# Patient Record
Sex: FEMALE | Race: BLACK OR AFRICAN AMERICAN | Employment: FULL TIME | URBAN - METROPOLITAN AREA
[De-identification: names, ages, dates, MRNs, and addresses within clinical notes are randomized per-mention and may not be internally consistent; named-entity substitution may affect disease eponyms.]

---

## 2022-03-19 PROBLEM — R00.2 PALPITATIONS: Status: ACTIVE | Noted: 2022-02-17

## 2022-06-21 PROBLEM — R06.09 DOE (DYSPNEA ON EXERTION): Status: ACTIVE | Noted: 2022-06-21

## 2023-10-23 ENCOUNTER — OFFICE VISIT (OUTPATIENT)
Dept: ORTHOPEDIC SURGERY | Age: 28
End: 2023-10-23

## 2023-10-23 DIAGNOSIS — M54.2 CHRONIC NECK PAIN: Primary | ICD-10-CM

## 2023-10-23 DIAGNOSIS — G89.29 CHRONIC NECK PAIN: Primary | ICD-10-CM

## 2023-10-23 RX ORDER — TIZANIDINE 2 MG/1
2 TABLET ORAL 2 TIMES DAILY
Qty: 30 TABLET | Refills: 2 | Status: SHIPPED | OUTPATIENT
Start: 2023-10-23

## 2023-10-23 NOTE — PROGRESS NOTES
Name: Rosy Cornelius  YOB: 1995  Gender: female  MRN: [id number]    CC:   Chief Complaint   Patient presents with    Neck Pain    New Patient         HPI:   Rosy Cornelius is a 33 y.o. 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 They present here for evaluation of neck pain. This been going on for approximately 3 years or so. She reports she was stretching in class in college years back and felt a pop in her neck. She has had bothersome neck pain since then. Not having any radicular symptoms or features. It is gradually gotten worse particularly when she is trying to sleep. She works nights as a nurse. Her neck pain wakes her up in the evening now at this point. She uses different pillows to accommodate and this has been her method of managing for the last few years. When she was having her AVM prior to being surgically coiled, she was having some numbness in her left arm with some vision changes and speech disruption. These have all essentially resolved since her AVM surgery. She has been advised to avoid NSAIDs given her vascular history.           Past Medical History Includes:   Past Medical History:   Diagnosis Date    Acne     Anxiety     Ill-defined condition     migraines    Knee pain     Migraines     Syncope    ,   Past Surgical History:   Procedure Laterality Date    IR TRANSCATH EMBOLIZATION NON CNS  4/8/2022    IR TRANSCATH EMBOLIZATION NON CNS  4/8/2022    IR TRANSCATH EMBOLIZATION NON CNS 4/8/2022 [facility]     Family History:   Family History   Problem Relation Age of Onset    Fainting Brother 25        vaso vagal syncope      Social History:   Social History     Tobacco Use    Smoking status: Never    Smokeless tobacco: Never   Substance Use Topics    Alcohol use: No       ALLERGIES:   Allergies   Allergen Reactions    Amoxicillin Other (See Comments)          Patient Medications    Current

## 2023-11-29 ENCOUNTER — TELEPHONE (OUTPATIENT)
Age: 28
End: 2023-11-29

## 2023-11-30 ENCOUNTER — TELEPHONE (OUTPATIENT)
Age: 28
End: 2023-11-30

## 2023-11-30 NOTE — PROGRESS NOTES
Right Left Comment   Flexion -4 -4    Scaption 4 4    IR  -4 -4    ER -4 -4    Extension      Elbow flexion      Elbow extension                           Treatment provided today consisted of initial evaluation followed by: Therapeutic exercise (29278) x 20 min to address ROM/strength deficits and to develop an initial HEP as noted below. Included:   AROM cervical spine (flexion, SB and Rot) 10 seconds hold 5x each  Levator stretch 30 seconds hold  Scap squeeze  Shoulder rolls back    Unattended electrical stimulation (23926/) x 15 minutes to bilateral UTs and cervical paraspinals in order to reduce pain and muscle spasms associated with increased activities in therapy as well as help reduce delayed pain episodes. Patient Education on the condition/pathology, involved anatomy, and exercise rationale. Education on posture with sitting and driving. Education for postioning for sleep and performing tasks. Education about posture for patient care and modifying how she does work duties. CLINICAL DECISION MAKING/ASSESSMENT     Personal Factors/co-morbidities affecting POC (1-2 Medium/3+High): coping styles/strategies  habits/routines  past/current experiences  profession  co-morbidities as previously noted   Problem List: (1-2 Low/ 3 Medium/ 4+ High) Pain  ROM limitations  Strength deficits  Impaired posture  Decreased endurance/activity Tolerance  ADL/functional limitations/modifications  Limited work/occupational capacity  Restricted recreational participation  Decreased Rooks with Home Exercise Program  Difficulty sleeping    Clinical decision making: moderate complexity with questionable prediction of expectations and future outcomes which may require adjustments to the POC. Prognosis: good   Benefits and precautions of treatment explained to patient.   Rosy Cornelius is a 33 y.o. female who presents to therapy today with evolving/changing clinical presentation (moderate complexity)

## 2023-11-30 NOTE — TELEPHONE ENCOUNTER
Pt called back in and wanted to take an earlier appt for Friday---I also scheduled her a few weeks out.

## 2023-12-01 ENCOUNTER — EVALUATION (OUTPATIENT)
Age: 28
End: 2023-12-01
Payer: COMMERCIAL

## 2023-12-01 DIAGNOSIS — M62.81 MUSCLE WEAKNESS: ICD-10-CM

## 2023-12-01 DIAGNOSIS — Z78.9 IMPAIRED MOBILITY AND ADLS: ICD-10-CM

## 2023-12-01 DIAGNOSIS — Z74.09 IMPAIRED MOBILITY AND ADLS: ICD-10-CM

## 2023-12-01 DIAGNOSIS — M43.6 STIFFNESS OF CERVICAL SPINE: ICD-10-CM

## 2023-12-01 DIAGNOSIS — M54.2 CERVICAL SPINE PAIN: Primary | ICD-10-CM

## 2023-12-01 PROCEDURE — 97110 THERAPEUTIC EXERCISES: CPT | Performed by: PHYSICAL THERAPIST

## 2023-12-01 PROCEDURE — 97014 ELECTRIC STIMULATION THERAPY: CPT | Performed by: PHYSICAL THERAPIST

## 2023-12-01 PROCEDURE — 97162 PT EVAL MOD COMPLEX 30 MIN: CPT | Performed by: PHYSICAL THERAPIST

## 2023-12-03 NOTE — PROGRESS NOTES
[address] [address] [facility] [address]  Dept: [phone]      Physical Therapy Daily Note        Insurance: No authorization required ([facility])  Today is visit 2    Total Timed Procedure Codes: 40 min, Total Time: 55 min      Referring MD: Mitali Andrade MD  Referral for: Chronic cervical spine pain (5+ years)  Onset Date: 6/1/202017    Diagnosis:     ICD-10-CM    1. Cervical spine pain  M54.2       2. Stiffness of cervical spine  M43.6       3. Muscle weakness  M62.81       4. Impaired mobility and ADLs  Z74.09     Z78.9              Therapy precautions: Due to AVM may get dizzy or overly fatigued with exercise  Co-morbidities affecting plan of care: Migraines, Arteriovenous malformation with sx to correct (6/1/2023), chronic dizziness,        PERTINENT MEDICAL HISTORY     Past medical and surgical history:   Past Medical History:   Diagnosis Date    Acne     Anxiety     Ill-defined condition     migraines    Knee pain     Migraines     Syncope       Past Surgical History:   Procedure Laterality Date    IR TRANSCATH EMBOLIZATION NON CNS  4/8/2022    IR TRANSCATH EMBOLIZATION NON CNS  4/8/2022    IR TRANSCATH EMBOLIZATION NON CNS 4/8/2022 [facility]     Medications: reviewed in chart   Allergies: Allergies   Allergen Reactions    Amoxicillin Other (See Comments)      Chief complaints/history of injury: Patient presents to therapy with chronic neck pain. She c/o pain right and left sides and central cervical spine. She has no c/o UE pain or radiating pain. She does have frequent headaches but states they could be related to AVM. She has c/o limited motion cervical spine. She has difficulty looking up and tilting head. This increases neck pain. She has difficulty sleeping due to pain lying in bed. She has increased pain with driving. She has weakness UE s (whole body). She was unable to exercise prior to last AVM surgery due to fatigue and has not resumed.  She is Prophylactic measure Elevated alkaline phosphatase level

## 2023-12-04 ENCOUNTER — TREATMENT (OUTPATIENT)
Age: 28
End: 2023-12-04
Payer: COMMERCIAL

## 2023-12-04 DIAGNOSIS — M54.2 CHRONIC NECK PAIN: ICD-10-CM

## 2023-12-04 DIAGNOSIS — M54.2 CERVICAL SPINE PAIN: Primary | ICD-10-CM

## 2023-12-04 DIAGNOSIS — M62.81 MUSCLE WEAKNESS: ICD-10-CM

## 2023-12-04 DIAGNOSIS — Z74.09 IMPAIRED MOBILITY AND ADLS: ICD-10-CM

## 2023-12-04 DIAGNOSIS — M43.6 STIFFNESS OF CERVICAL SPINE: ICD-10-CM

## 2023-12-04 DIAGNOSIS — Z78.9 IMPAIRED MOBILITY AND ADLS: ICD-10-CM

## 2023-12-04 DIAGNOSIS — G89.29 CHRONIC NECK PAIN: ICD-10-CM

## 2023-12-04 PROCEDURE — 97014 ELECTRIC STIMULATION THERAPY: CPT | Performed by: PHYSICAL THERAPIST

## 2023-12-04 PROCEDURE — 97110 THERAPEUTIC EXERCISES: CPT | Performed by: PHYSICAL THERAPIST

## 2023-12-04 PROCEDURE — 97140 MANUAL THERAPY 1/> REGIONS: CPT | Performed by: PHYSICAL THERAPIST

## 2023-12-08 ENCOUNTER — TREATMENT (OUTPATIENT)
Age: 28
End: 2023-12-08
Payer: COMMERCIAL

## 2023-12-08 DIAGNOSIS — Z74.09 IMPAIRED MOBILITY AND ADLS: ICD-10-CM

## 2023-12-08 DIAGNOSIS — M54.2 CERVICAL SPINE PAIN: Primary | ICD-10-CM

## 2023-12-08 DIAGNOSIS — M62.81 MUSCLE WEAKNESS: ICD-10-CM

## 2023-12-08 DIAGNOSIS — G89.29 CHRONIC NECK PAIN: ICD-10-CM

## 2023-12-08 DIAGNOSIS — Z78.9 IMPAIRED MOBILITY AND ADLS: ICD-10-CM

## 2023-12-08 DIAGNOSIS — M43.6 STIFFNESS OF CERVICAL SPINE: ICD-10-CM

## 2023-12-08 DIAGNOSIS — M54.2 CHRONIC NECK PAIN: ICD-10-CM

## 2023-12-08 PROCEDURE — 97140 MANUAL THERAPY 1/> REGIONS: CPT | Performed by: PHYSICAL THERAPIST

## 2023-12-08 PROCEDURE — 97110 THERAPEUTIC EXERCISES: CPT | Performed by: PHYSICAL THERAPIST

## 2023-12-08 NOTE — PROGRESS NOTES
[address] [address]  Dept: [phone]      Physical Therapy Daily Note        Insurance: No authorization required ([facility])  Today is visit 2    Total Timed Procedure Codes: 40 min, Total Time: 55 min      Referring MD: Conan Sicard, MD  Referral for: Chronic cervical spine pain (5+ years)  Onset Date: 6/1/202017    Diagnosis:     ICD-10-CM    1. Cervical spine pain  M54.2       2. Stiffness of cervical spine  M43.6       3. Muscle weakness  M62.81       4. Impaired mobility and ADLs  Z74.09     Z78.9       5. Chronic neck pain [M54.2, G89.29]  M54.2     G89.29              Therapy precautions: Due to AVM may get dizzy or overly fatigued with exercise  Co-morbidities affecting plan of care: Migraines, Arteriovenous malformation with sx to correct (6/1/2023), chronic dizziness,        PERTINENT MEDICAL HISTORY     Past medical and surgical history:   Past Medical History:   Diagnosis Date    Acne     Anxiety     Ill-defined condition     migraines    Knee pain     Migraines     Syncope       Past Surgical History:   Procedure Laterality Date    IR TRANSCATH EMBOLIZATION NON CNS  4/8/2022    IR TRANSCATH EMBOLIZATION NON CNS  4/8/2022    IR TRANSCATH EMBOLIZATION NON CNS 4/8/2022 [facility]     Medications: reviewed in chart   Allergies: Allergies   Allergen Reactions    Amoxicillin Other (See Comments)      Chief complaints/history of injury: Patient presents to therapy with chronic neck pain. She c/o pain right and left sides and central cervical spine. She has no c/o UE pain or radiating pain. She does have frequent headaches but states they could be related to AVM. She has c/o limited motion cervical spine. She has difficulty looking up and tilting head. This increases neck pain. She has difficulty sleeping due to pain lying in bed. She has increased pain with driving. She has weakness UE s (whole body).  She was unable to exercise prior to

## 2023-12-10 NOTE — PROGRESS NOTES
[address] [address]  Dept: [phone]      Physical Therapy Daily Note        Insurance: No authorization required ([id number])  Today is visit 4    Total Timed Procedure Codes: 45 min, Total Time: 50 min      Referring MD: Larose Essex, MD  Referral for: Chronic cervical spine pain (5+ years)  Onset Date: 6/1/202017    Diagnosis:     ICD-10-CM    1. Cervical spine pain  M54.2       2. Stiffness of cervical spine  M43.6       3. Muscle weakness  M62.81       4. Impaired mobility and ADLs  Z74.09     Z78.9       5. Chronic neck pain [M54.2, G89.29]  M54.2     G89.29              Therapy precautions: Due to AVM may get dizzy or overly fatigued with exercise  Co-morbidities affecting plan of care: Migraines, Arteriovenous malformation with sx to correct (6/1/2023), chronic dizziness,        PERTINENT MEDICAL HISTORY     Past medical and surgical history:   Past Medical History:   Diagnosis Date    Acne     Anxiety     Ill-defined condition     migraines    Knee pain     Migraines     Syncope       Past Surgical History:   Procedure Laterality Date    IR TRANSCATH EMBOLIZATION NON CNS  4/8/2022    IR TRANSCATH EMBOLIZATION NON CNS  4/8/2022    IR TRANSCATH EMBOLIZATION NON CNS 4/8/2022 [facility]     Medications: reviewed in chart   Allergies: Allergies   Allergen Reactions    Amoxicillin Other (See Comments)      Chief complaints/history of injury: Patient presents to therapy with chronic neck pain. She c/o pain right and left sides and central cervical spine. She has no c/o UE pain or radiating pain. She does have frequent headaches but states they could be related to AVM. She has c/o limited motion cervical spine. She has difficulty looking up and tilting head. This increases neck pain. She has difficulty sleeping due to pain lying in bed. She has increased pain with driving. She has weakness UE s (whole body).  She was unable to exercise prior to

## 2023-12-11 ENCOUNTER — TREATMENT (OUTPATIENT)
Age: 28
End: 2023-12-11
Payer: COMMERCIAL

## 2023-12-11 DIAGNOSIS — M43.6 STIFFNESS OF CERVICAL SPINE: ICD-10-CM

## 2023-12-11 DIAGNOSIS — M54.2 CERVICAL SPINE PAIN: Primary | ICD-10-CM

## 2023-12-11 DIAGNOSIS — G89.29 CHRONIC NECK PAIN: ICD-10-CM

## 2023-12-11 DIAGNOSIS — M62.81 MUSCLE WEAKNESS: ICD-10-CM

## 2023-12-11 DIAGNOSIS — Z74.09 IMPAIRED MOBILITY AND ADLS: ICD-10-CM

## 2023-12-11 DIAGNOSIS — Z78.9 IMPAIRED MOBILITY AND ADLS: ICD-10-CM

## 2023-12-11 DIAGNOSIS — M54.2 CHRONIC NECK PAIN: ICD-10-CM

## 2023-12-11 PROCEDURE — 97140 MANUAL THERAPY 1/> REGIONS: CPT | Performed by: PHYSICAL THERAPIST

## 2023-12-11 PROCEDURE — 97110 THERAPEUTIC EXERCISES: CPT | Performed by: PHYSICAL THERAPIST

## 2023-12-14 NOTE — PROGRESS NOTES
[address] [address]  Dept: [phone]      Physical Therapy Daily Note        Insurance: No authorization required ([id number])  Today is visit 5    Total Timed Procedure Codes: 45 min, Total Time: 50 min      Referring MD: Fco De Los Santos MD  Referral for: Chronic cervical spine pain (5+ years)  Onset Date: 6/1/202017    Diagnosis:     ICD-10-CM    1. Cervical spine pain  M54.2       2. Stiffness of cervical spine  M43.6       3. Muscle weakness  M62.81       4. Impaired mobility and ADLs  Z74.09     Z78.9       5. Chronic neck pain [M54.2, G89.29]  M54.2     G89.29              Therapy precautions: Due to AVM may get dizzy or overly fatigued with exercise  Co-morbidities affecting plan of care: Migraines, Arteriovenous malformation with sx to correct (6/1/2023), chronic dizziness,        PERTINENT MEDICAL HISTORY     Past medical and surgical history:   Past Medical History:   Diagnosis Date    Acne     Anxiety     Ill-defined condition     migraines    Knee pain     Migraines     Syncope       Past Surgical History:   Procedure Laterality Date    IR TRANSCATH EMBOLIZATION NON CNS  4/8/2022    IR TRANSCATH EMBOLIZATION NON CNS  4/8/2022    IR TRANSCATH EMBOLIZATION NON CNS 4/8/2022 [facility]     Medications: reviewed in chart   Allergies: Allergies   Allergen Reactions    Amoxicillin Other (See Comments)      Chief complaints/history of injury: Patient presents to therapy with chronic neck pain. She c/o pain right and left sides and central cervical spine. She has no c/o UE pain or radiating pain. She does have frequent headaches but states they could be related to AVM. She has c/o limited motion cervical spine. She has difficulty looking up and tilting head. This increases neck pain. She has difficulty sleeping due to pain lying in bed. She has increased pain with driving. She has weakness UE s (whole body).  She was unable to exercise prior to

## 2023-12-15 ENCOUNTER — TREATMENT (OUTPATIENT)
Age: 28
End: 2023-12-15

## 2023-12-15 DIAGNOSIS — M54.2 CERVICAL SPINE PAIN: Primary | ICD-10-CM

## 2023-12-15 DIAGNOSIS — M54.2 CHRONIC NECK PAIN: ICD-10-CM

## 2023-12-15 DIAGNOSIS — M62.81 MUSCLE WEAKNESS: ICD-10-CM

## 2023-12-15 DIAGNOSIS — Z78.9 IMPAIRED MOBILITY AND ADLS: ICD-10-CM

## 2023-12-15 DIAGNOSIS — M43.6 STIFFNESS OF CERVICAL SPINE: ICD-10-CM

## 2023-12-15 DIAGNOSIS — G89.29 CHRONIC NECK PAIN: ICD-10-CM

## 2023-12-15 DIAGNOSIS — Z74.09 IMPAIRED MOBILITY AND ADLS: ICD-10-CM

## 2023-12-28 ENCOUNTER — TREATMENT (OUTPATIENT)
Age: 28
End: 2023-12-28

## 2023-12-28 DIAGNOSIS — M43.6 STIFFNESS OF CERVICAL SPINE: ICD-10-CM

## 2023-12-28 DIAGNOSIS — M62.81 MUSCLE WEAKNESS: ICD-10-CM

## 2023-12-28 DIAGNOSIS — Z74.09 IMPAIRED MOBILITY AND ADLS: ICD-10-CM

## 2023-12-28 DIAGNOSIS — Z78.9 IMPAIRED MOBILITY AND ADLS: ICD-10-CM

## 2023-12-28 DIAGNOSIS — M54.2 CERVICAL SPINE PAIN: Primary | ICD-10-CM

## 2023-12-28 NOTE — PROGRESS NOTES
[address] [facility]  [address] [facility]  [place] [address]  Dept: [phone]      Physical Therapy Discharged summary        Insurance: No authorization required ([id number])  Today is visit 7    Total Timed Procedure Codes: 40 min, Total Time: 40 min      Referring MD: Tyron Huff MD  Referral for: Chronic cervical spine pain (5+ years)  Onset Date: 6/1/202017    Diagnosis:     ICD-10-CM    1. Cervical spine pain  M54.2       2. Stiffness of cervical spine  M43.6       3. Muscle weakness  M62.81       4. Impaired mobility and ADLs  Z74.09     Z78.9              Therapy precautions: Due to AVM may get dizzy or overly fatigued with exercise  Co-morbidities affecting plan of care: Migraines, Arteriovenous malformation with sx to correct (6/1/2023), chronic dizziness,       SUBJECTIVE   Patient states 0/10 pain cervical spine. No c/o UE pain. She has no report of pain in last week. She is changing work locations and wants to continue with exercises independently. She states she is able to manage any symptoms at this time. Changes in medications: None since last visit    OBJECTIVE        Treatment provided today consisted of: Therapeutic exercise (30055) x 40 min to address ROM/strength deficits and to progress HEP as noted below. Included:   Shoulder rolls back  LTR with pec stretch 2x 30 seconds  Bilateral UE ER with green band 15x2    Standing row with green tube 30x  Standing shoulder extension with green tube 30x  Standing horizontal abduction with green band 10x2    Standing shoulder flexion with 2# 10x2  Standing shoulder scaption with 2# 10x2        AROM Measurements : 4/4 is normal     Cervical Spine Right Left Comments   Flexion 4       Extension 4       Rotation 4 4     Side Bend 4 4                  Strength/MMT (0-5 Scale):      Shoulder Right Left Comment   Flexion +4 +4     Scaption +4 +     IR  4 4     ER +4 +4     Extension         Elbow flexion  +4  +4     Elbow extension  4

## 2024-10-24 DIAGNOSIS — G89.29 CHRONIC NECK PAIN: ICD-10-CM

## 2024-10-24 DIAGNOSIS — M54.2 CHRONIC NECK PAIN: ICD-10-CM

## 2024-10-25 RX ORDER — TIZANIDINE 2 MG/1
2 TABLET ORAL 2 TIMES DAILY
Qty: 30 TABLET | Refills: 2 | Status: SHIPPED | OUTPATIENT
Start: 2024-10-25